# Patient Record
Sex: MALE | URBAN - METROPOLITAN AREA
[De-identification: names, ages, dates, MRNs, and addresses within clinical notes are randomized per-mention and may not be internally consistent; named-entity substitution may affect disease eponyms.]

---

## 2021-10-27 ENCOUNTER — OFFICE VISIT (OUTPATIENT)
Dept: URBAN - METROPOLITAN AREA CLINIC 88 | Facility: CLINIC | Age: 31
End: 2021-10-27
Payer: COMMERCIAL

## 2021-10-27 DIAGNOSIS — H52.223 REGULAR ASTIGMATISM, BILATERAL: Primary | ICD-10-CM

## 2021-10-27 PROCEDURE — 92004 COMPRE OPH EXAM NEW PT 1/>: CPT | Performed by: OPTOMETRIST

## 2021-10-27 ASSESSMENT — VISUAL ACUITY
OD: 20/20
OS: 20/20

## 2021-10-27 ASSESSMENT — INTRAOCULAR PRESSURE
OS: 12
OD: 12

## 2021-10-27 ASSESSMENT — KERATOMETRY
OS: 42.75
OD: 42.75

## 2021-10-27 NOTE — IMPRESSION/PLAN
Impression: Regular astigmatism, bilateral: H52.223. Plan: Reviewed refractive prescription in detail with patient and need for glasses to improve vision at this time. Discussed lens options and designs, including blue blocking lenses for computer use. Ok to release spectacle prescription.